# Patient Record
Sex: FEMALE | URBAN - METROPOLITAN AREA
[De-identification: names, ages, dates, MRNs, and addresses within clinical notes are randomized per-mention and may not be internally consistent; named-entity substitution may affect disease eponyms.]

---

## 2024-09-16 ENCOUNTER — OFFICE VISIT (OUTPATIENT)
Dept: ENT CLINIC | Age: 59
End: 2024-09-16
Payer: COMMERCIAL

## 2024-09-16 ENCOUNTER — PROCEDURE VISIT (OUTPATIENT)
Dept: AUDIOLOGY | Age: 59
End: 2024-09-16
Payer: COMMERCIAL

## 2024-09-16 VITALS
SYSTOLIC BLOOD PRESSURE: 136 MMHG | DIASTOLIC BLOOD PRESSURE: 82 MMHG | TEMPERATURE: 97.8 F | HEART RATE: 86 BPM | OXYGEN SATURATION: 96 % | WEIGHT: 227 LBS

## 2024-09-16 DIAGNOSIS — R13.10 DYSPHAGIA, UNSPECIFIED TYPE: Chronic | ICD-10-CM

## 2024-09-16 DIAGNOSIS — H93.13 SUBJECTIVE TINNITUS OF BOTH EARS: Chronic | ICD-10-CM

## 2024-09-16 DIAGNOSIS — J32.9 CHRONIC SINUSITIS, UNSPECIFIED LOCATION: Chronic | ICD-10-CM

## 2024-09-16 DIAGNOSIS — H93.13 TINNITUS OF BOTH EARS: ICD-10-CM

## 2024-09-16 DIAGNOSIS — H90.3 SENSORINEURAL HEARING LOSS (SNHL) OF BOTH EARS: Primary | ICD-10-CM

## 2024-09-16 DIAGNOSIS — H93.8X3 PRESSURE SENSATION IN BOTH EARS: ICD-10-CM

## 2024-09-16 DIAGNOSIS — R09.82 POST-NASAL DRIP: Chronic | ICD-10-CM

## 2024-09-16 DIAGNOSIS — H90.3 BILATERAL HIGH FREQUENCY SENSORINEURAL HEARING LOSS: Primary | Chronic | ICD-10-CM

## 2024-09-16 PROCEDURE — 92557 COMPREHENSIVE HEARING TEST: CPT | Performed by: AUDIOLOGIST

## 2024-09-16 PROCEDURE — 99204 OFFICE O/P NEW MOD 45 MIN: CPT | Performed by: OTOLARYNGOLOGY

## 2024-09-16 PROCEDURE — 92567 TYMPANOMETRY: CPT | Performed by: AUDIOLOGIST

## 2024-09-16 RX ORDER — MULTIVITAMIN
1 TABLET ORAL DAILY
COMMUNITY

## 2024-09-16 RX ORDER — SUCRALFATE 1 G/1
1 TABLET ORAL
COMMUNITY
Start: 2024-06-21

## 2024-09-16 RX ORDER — METFORMIN HCL 500 MG
500 TABLET, EXTENDED RELEASE 24 HR ORAL
COMMUNITY
Start: 2024-06-21

## 2024-09-16 RX ORDER — PSEUDOEPHEDRINE HCL 30 MG
30 TABLET ORAL EVERY 4 HOURS PRN
COMMUNITY

## 2024-09-16 RX ORDER — OMEPRAZOLE 10 MG/1
10 CAPSULE, DELAYED RELEASE ORAL 2 TIMES DAILY
COMMUNITY

## 2024-09-16 RX ORDER — CELECOXIB 200 MG/1
200 CAPSULE ORAL 2 TIMES DAILY
COMMUNITY
Start: 2023-02-27

## 2024-09-16 RX ORDER — ACETAMINOPHEN 325 MG/1
975 TABLET ORAL EVERY 8 HOURS PRN
COMMUNITY
Start: 2023-03-06

## 2024-09-16 RX ORDER — CETIRIZINE HYDROCHLORIDE 10 MG/1
10 TABLET ORAL DAILY
COMMUNITY

## 2024-09-16 RX ORDER — NAPROXEN 500 MG/1
1 TABLET ORAL 2 TIMES DAILY WITH MEALS
COMMUNITY
Start: 2016-06-27

## 2024-09-16 RX ORDER — FLUOCINONIDE TOPICAL SOLUTION USP, 0.05% 0.5 MG/ML
SOLUTION TOPICAL
COMMUNITY
Start: 2023-09-25

## 2024-09-16 RX ORDER — FLUTICASONE PROPIONATE 50 MCG
1-2 SPRAY, SUSPENSION (ML) NASAL DAILY PRN
COMMUNITY
Start: 2014-09-22

## 2024-09-26 ENCOUNTER — TELEPHONE (OUTPATIENT)
Dept: ENT CLINIC | Age: 59
End: 2024-09-26

## 2024-10-11 ENCOUNTER — HOSPITAL ENCOUNTER (OUTPATIENT)
Dept: SPEECH THERAPY | Age: 59
Setting detail: THERAPIES SERIES
Discharge: HOME | End: 2024-10-11
Attending: OTOLARYNGOLOGY
Payer: COMMERCIAL

## 2024-10-11 ENCOUNTER — HOSPITAL ENCOUNTER (OUTPATIENT)
Dept: GENERAL RADIOLOGY | Age: 59
Discharge: HOME | End: 2024-10-11
Attending: OTOLARYNGOLOGY
Payer: COMMERCIAL

## 2024-10-11 ENCOUNTER — HOSPITAL ENCOUNTER (OUTPATIENT)
Dept: CT IMAGING | Age: 59
Discharge: HOME | End: 2024-10-11
Attending: OTOLARYNGOLOGY
Payer: COMMERCIAL

## 2024-10-11 DIAGNOSIS — R09.82 POST-NASAL DRIP: Chronic | ICD-10-CM

## 2024-10-11 DIAGNOSIS — R13.10 DYSPHAGIA, UNSPECIFIED TYPE: Chronic | ICD-10-CM

## 2024-10-11 DIAGNOSIS — J32.9 CHRONIC SINUSITIS, UNSPECIFIED LOCATION: Chronic | ICD-10-CM

## 2024-10-11 PROCEDURE — 92526 ORAL FUNCTION THERAPY: CPT

## 2024-10-11 PROCEDURE — 74230 X-RAY XM SWLNG FUNCJ C+: CPT

## 2024-10-11 PROCEDURE — 70486 CT MAXILLOFACIAL W/O DYE: CPT

## 2024-10-11 PROCEDURE — 92611 MOTION FLUOROSCOPY/SWALLOW: CPT

## 2024-10-11 NOTE — PROCEDURES
Facility/Department: [facility] SPEECH THERAPY  MODIFIED BARIUM SWALLOW EVALUATION    Patient: Marlena Gonzalez   : 1965   MRN: [id number]      Evaluation Date: 10/11/2024   Admitting Diagnosis: Dysphagia, unspecified type [R13.10]  Treatment Diagnosis: Dysphagia   Pain:  Did not state                           Ordering MD: Dr. Pedro Handy MD  Radiologist: Dr. Luis Antonio Stover MD  Date of Onset: 5-10 years ago  Date of Evaluation: 10/11/2024  Type of Study: Modified Barium Swallowing Study (MBS)  Diet Prior to Study: Regular texture diet  Thin liquids ; no diet on file  Reason for referral/HPI: The patient presents for instrumental swallow study to evaluate oropharyngeal swallow function, assess aspiration risk, and determine least restrictive diet/plan of care. The pt is a 59 year old female presenting to swallow study with choking episodes, 1-2 x/week, on thin liquids and saliva. Medical hx significant for GERD 10+ years, pt on Nexium and reports routine endoscopies with esophageal dilations. Most recent dilation last year did not improve her symptoms/swallowing. Seen by ENT 2024 for tinnitus and hearing loss, also reporting post nasal drip, sinus pain, sore throat, and voice changes in addition to swallowing difficulty. Sinus CT completed this AM.      Impression:  Modified Barium Swallow evaluation completed on 10/11/2024. Patient presents with grossly function oropharyngeal swallow. Minimally reduced bolus control and base of tongue retraction noted, resulting in premature bolus loss of soft solids to the valleculae/pyriform sinuses and trace-mild vallecular/base of tongue residue post swallow with liquids (greater with thicker liquids). Swallow initiation was timely for all other consistencies, pharyngeal residue was spontaneously cleared with sequential swallows. No penetration or aspiration was viewed during the study. Overall, recommend continuation of regular diet with thin liquids and return to

## 2024-10-25 ENCOUNTER — OFFICE VISIT (OUTPATIENT)
Dept: ENT CLINIC | Age: 59
End: 2024-10-25

## 2024-10-25 VITALS
BODY MASS INDEX: 41.11 KG/M2 | HEIGHT: 63 IN | SYSTOLIC BLOOD PRESSURE: 121 MMHG | OXYGEN SATURATION: 97 % | DIASTOLIC BLOOD PRESSURE: 84 MMHG | HEART RATE: 72 BPM | TEMPERATURE: 97.9 F | WEIGHT: 232 LBS

## 2024-10-25 DIAGNOSIS — J34.1 MUCOCELE OF MAXILLARY SINUS: Chronic | ICD-10-CM

## 2024-10-25 DIAGNOSIS — R09.82 POST-NASAL DRIP: Primary | Chronic | ICD-10-CM

## 2024-10-25 DIAGNOSIS — R49.9 VOICE DISORDER: Chronic | ICD-10-CM

## 2024-10-25 DIAGNOSIS — R13.10 DYSPHAGIA, UNSPECIFIED TYPE: Chronic | ICD-10-CM

## 2024-10-25 NOTE — PROGRESS NOTES
CHIEF COMPLAINT  Chief Complaint   Patient presents with    Dysphagia    Sinusitis    post nasal drainage       HISTORY OF PRESENT ILLNESS     Marlena Gonzalez is a 59 y.o. female here for recheck and follow up of the above chief complaint.  Patient stated that Voice change, deeper and more hoarse for ten years, no recent changes in the past 3 years.  Never smoked.       REVIEW OF SYSTEMS  Constitutional:  Denied fever and chills.  ENT/sinus:  Denied otalgia, otorrhea, nasal pain, rhinorrhea, sore throat, and sinus/facial pain.        EXAMINATION    Vitals:    10/25/24 1028   BP: 121/84   Site: Left Upper Arm   Position: Sitting   Cuff Size: Large Adult   Pulse: 72   Temp: 97.9 °F (36.6 °C)   TempSrc: Infrared   SpO2: 97%   Weight: 105.2 kg (232 lb)   Height: 1.6 m (5' 3\")       WDWN, NAD  Face:  Normal skin.    Voice:  Normal, with no hoarseness, breathiness, or hot potato quality.  Ears:   TMs and EACs were normal.  The mastoids and pinnae were normal.    Nose:  Normal.    Sinuses: Nontender x 4   Throat,  OC/OP:  Normal.    Neck:  NT, No masses.  Trachea midline.    Nodes:  No lymphadenopathy.     Thyroid:  Normal        I performed this physical exam personally.        REVIEW OF IMAGES:         I independently interpreted the images of the CT scan of the sinuses, along with the patient observing, showing a mucus retention cyst in the left maxillary sinus and otherwise normal sinuses and nasal cavities.        CT OF THE SINUS WITHOUT CONTRAST  10/11/2024   FINDINGS:  SINUSES/MASTOIDS: Left maxillary sinus posterior small mucous retention cyst  is present.  The maxillary, sphenoid, ethmoid and frontal sinuses are clear.  The bilateral ostiomeatal units are patent.  Ethmoid roofs are symmetric.  The mastoid air cells are well aerated.  Mild rightward deviation of the  nasal septum is present which measures 2.5 mm.  Left middle turbinate lennox  bullosa is present.     Left middle     SOFT TISSUES:  Visualized

## 2025-08-14 ENCOUNTER — OFFICE VISIT (OUTPATIENT)
Dept: SURGERY | Age: 60
End: 2025-08-14
Payer: COMMERCIAL

## 2025-08-14 VITALS — SYSTOLIC BLOOD PRESSURE: 118 MMHG | WEIGHT: 221.6 LBS | DIASTOLIC BLOOD PRESSURE: 78 MMHG | BODY MASS INDEX: 39.25 KG/M2

## 2025-08-14 DIAGNOSIS — K21.9 HIATAL HERNIA WITH GERD: Primary | ICD-10-CM

## 2025-08-14 DIAGNOSIS — K44.9 HIATAL HERNIA WITH GERD: Primary | ICD-10-CM

## 2025-08-14 PROCEDURE — 99203 OFFICE O/P NEW LOW 30 MIN: CPT | Performed by: SURGERY

## 2025-08-14 ASSESSMENT — ENCOUNTER SYMPTOMS
RESPIRATORY NEGATIVE: 1
ABDOMINAL PAIN: 1
EYES NEGATIVE: 1
ALLERGIC/IMMUNOLOGIC NEGATIVE: 1

## 2025-08-21 ENCOUNTER — TELEPHONE (OUTPATIENT)
Dept: SURGERY | Age: 60
End: 2025-08-21

## 2025-08-25 ENCOUNTER — HOSPITAL ENCOUNTER (OUTPATIENT)
Dept: GENERAL RADIOLOGY | Age: 60
Discharge: HOME | End: 2025-08-25
Attending: SURGERY
Payer: COMMERCIAL

## 2025-08-25 DIAGNOSIS — K44.9 HIATAL HERNIA WITH GERD: ICD-10-CM

## 2025-08-25 DIAGNOSIS — K21.9 HIATAL HERNIA WITH GERD: ICD-10-CM

## 2025-08-25 PROCEDURE — 74221 X-RAY XM ESOPHAGUS 2CNTRST: CPT

## 2025-08-28 ENCOUNTER — HOSPITAL ENCOUNTER (OUTPATIENT)
Dept: ENDOSCOPY | Age: 60
Discharge: HOME | End: 2025-08-28
Payer: COMMERCIAL

## 2025-08-28 PROCEDURE — 3609015500 HC GASTRIC/DUODENAL MOTILITY &/OR MANOMETRY STUDY
